# Patient Record
Sex: MALE | Race: WHITE | NOT HISPANIC OR LATINO | Employment: FULL TIME | URBAN - METROPOLITAN AREA
[De-identification: names, ages, dates, MRNs, and addresses within clinical notes are randomized per-mention and may not be internally consistent; named-entity substitution may affect disease eponyms.]

---

## 2018-01-01 ENCOUNTER — NURSE TRIAGE (OUTPATIENT)
Dept: ADMINISTRATIVE | Facility: CLINIC | Age: 38
End: 2018-01-01

## 2018-01-01 NOTE — TELEPHONE ENCOUNTER
Reason for Disposition   [1] Chest pain lasts > 5 minutes AND [2] age > 30 AND [3] at least one cardiac risk factor (i.e., hypertension, diabetes, obesity, smoker or strong family history of heart disease)     Sonny recently moved to [place] from [place].  He states he has been having CP for about a week.  Last night he began to also be short of breath, was sweating profusely, and had left arm numbness, all of which continue today.  He is audibly short of breath, speaking haltingly during this call.  Recommended he call 911 now.  He said he will do so. Current cigarette smoker, hypertension.  History of  non-Hodgkin's lymphoma, per chart notes. Message to Dr Srinath Mccrary, hemonc, and Dr Lacey, nephrology.  Please contact caller directly with any additional care advice.    Protocols used: ST CHEST PAIN-A-

## 2018-01-07 ENCOUNTER — NURSE TRIAGE (OUTPATIENT)
Dept: ADMINISTRATIVE | Facility: CLINIC | Age: 38
End: 2018-01-07

## 2018-01-07 NOTE — TELEPHONE ENCOUNTER
"ED 1/1   Family called re 1/2 blacked out- not responsive, numbness on left side. Numbness Sx on and off. Back hurting    Reason for Disposition   Headache  (and neurologic deficit)    Answer Assessment - Initial Assessment Questions  1. SYMPTOM: "What is the main symptom you are concerned about?" (e.g., weakness, numbness)     1/2 sudden neuro, vomited up blood today  2. ONSET: "When did this start?" (minutes, hours, days; while sleeping)     1/2   3. LAST NORMAL: "When was the last time you were normal (no symptoms)?"    1/1   4. PATTERN "Does this come and go, or has it been constant since it started?"  "Is it present now?"    Comes and goes, heart racing. SOB   5. CARDIAC SYMPTOMS: "Have you had any of the following symptoms: chest pain, difficulty breathing, palpitations?"    SOB   6. NEUROLOGIC SYMPTOMS: "Have you had any of the following symptoms: headache, dizziness, vision loss, double vision, changes in speech, unsteady on your feet?"    HA   7. OTHER SYMPTOMS: "Do you have any other symptoms?"    Not ill    Protocols used: ST NEUROLOGIC DEFICIT-A-  rec ED due to vomiting blood> 1 tbsp, L sided numbness, SOB, HA. Spouse states that ambulance not covered by insurance last week. Call back with questions     "

## 2018-02-09 ENCOUNTER — NURSE TRIAGE (OUTPATIENT)
Dept: ADMINISTRATIVE | Facility: CLINIC | Age: 38
End: 2018-02-09

## 2018-02-09 NOTE — TELEPHONE ENCOUNTER
Reason for Disposition   Watery or blood-tinged fluid dripping from the nose or ears    Protocols used: ST HEAD INJURY-A-[place]

## 2018-02-12 NOTE — TELEPHONE ENCOUNTER
Please contact patient and ask whether he is still having symptoms. Patient can try over the counter nasal saline. If problem persists patient will need to be evaluated.

## 2018-03-14 ENCOUNTER — NURSE TRIAGE (OUTPATIENT)
Dept: ADMINISTRATIVE | Facility: CLINIC | Age: 38
End: 2018-03-14

## 2018-03-14 NOTE — TELEPHONE ENCOUNTER
Reason for Disposition   Pain is mainly in upper abdomen (if needed ask: 'is it mainly above the belly button?')   Visible sweat on face or sweat is dripping down    Protocols used: ST ABDOMINAL PAIN - MALE-A-[place], ST ABDOMINAL PAIN - UPPER-A-[place]

## 2018-03-15 NOTE — TELEPHONE ENCOUNTER
"Request for ED f/u (seen 3/12/18) for abdominal pain. Request for F/U was on 3/13/18. Pt was scheduled to have EGD yesterday,so I wanted to wait and review results of EGD.  Pt apparently called "On-Call" nurse yesterday pm.  Pt was to have EGD done yesterday 3/14/18 but did not present for the procedure (no showed).  I attempted to call pt at 8:20 am today 3/15/18; no answer, left message for him to call clinic.  "